# Patient Record
Sex: FEMALE | Race: WHITE | NOT HISPANIC OR LATINO | Employment: FULL TIME
[De-identification: names, ages, dates, MRNs, and addresses within clinical notes are randomized per-mention and may not be internally consistent; named-entity substitution may affect disease eponyms.]

---

## 2017-04-07 ENCOUNTER — CHARTING TRANS (OUTPATIENT)
Dept: OTHER | Age: 22
End: 2017-04-07

## 2017-04-07 ENCOUNTER — LAB SERVICES (OUTPATIENT)
Dept: OTHER | Age: 22
End: 2017-04-07

## 2017-04-07 LAB
APPEARANCE: NORMAL
BILIRUBIN: NORMAL
COLOR: NORMAL
GLUCOSE U: NORMAL
KETONES: NORMAL
LEUKOCYTES: NORMAL
NITRITE: NORMAL
OCCULT BLOOD: NORMAL
PH: 7.5
PROTEIN: NORMAL
URINE SPEC GRAVITY: 1.02
UROBILINOGEN: NORMAL

## 2017-04-10 ENCOUNTER — CHARTING TRANS (OUTPATIENT)
Dept: OTHER | Age: 22
End: 2017-04-10

## 2017-04-14 LAB — BACTERIA UR CULT: NORMAL
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
## 2017-08-01 NOTE — TELEPHONE ENCOUNTER
Diclofenac Sodium 50 MG Oral Tab EC 20 tablet 0 7/5/2017    Sig :  Take 1 tablet (50 mg total) by mouth 2 (two) times daily. Route:   Oral       Pt is requesting refill till appointment clinic cancled. To Pharmacy on file. [id number] [place]
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
## 2017-08-15 ENCOUNTER — LAB ENCOUNTER (OUTPATIENT)
Dept: LAB | Age: 22
End: 2017-08-15
Attending: INTERNAL MEDICINE
Payer: COMMERCIAL

## 2017-08-15 DIAGNOSIS — Z00.00 LABORATORY EXAM ORDERED AS PART OF ROUTINE GENERAL MEDICAL EXAMINATION: ICD-10-CM

## 2017-08-15 LAB
25-HYDROXYVITAMIN D (TOTAL): 31.6 NG/ML (ref 30–100)
ALBUMIN SERPL-MCNC: 3.3 G/DL (ref 3.5–4.8)
ALP LIVER SERPL-CCNC: 69 U/L (ref 52–144)
ALT SERPL-CCNC: 26 U/L (ref 14–54)
AST SERPL-CCNC: 15 U/L (ref 15–41)
BASOPHILS # BLD AUTO: 0.03 X10(3) UL (ref 0–0.1)
BASOPHILS NFR BLD AUTO: 0.3 %
BILIRUB SERPL-MCNC: 0.4 MG/DL (ref 0.1–2)
BUN BLD-MCNC: 9 MG/DL (ref 8–20)
CALCIUM BLD-MCNC: 9 MG/DL (ref 8.3–10.3)
CHLORIDE: 107 MMOL/L (ref 101–111)
CO2: 26 MMOL/L (ref 22–32)
CREAT BLD-MCNC: 0.57 MG/DL (ref 0.55–1.02)
EOSINOPHIL # BLD AUTO: 0.35 X10(3) UL (ref 0–0.3)
EOSINOPHIL NFR BLD AUTO: 4 %
ERYTHROCYTE [DISTWIDTH] IN BLOOD BY AUTOMATED COUNT: 14.4 % (ref 11.5–16)
GLUCOSE BLD-MCNC: 72 MG/DL (ref 70–99)
HAV AB SERPL IA-ACNC: 472 PG/ML (ref 193–986)
HCT VFR BLD AUTO: 39.1 % (ref 34–50)
HGB BLD-MCNC: 11.6 G/DL (ref 12–16)
IMMATURE GRANULOCYTE COUNT: 0.03 X10(3) UL (ref 0–1)
IMMATURE GRANULOCYTE RATIO %: 0.3 %
LYMPHOCYTES # BLD AUTO: 1.89 X10(3) UL (ref 0.9–4)
LYMPHOCYTES NFR BLD AUTO: 21.3 %
M PROTEIN MFR SERPL ELPH: 7.3 G/DL (ref 6.1–8.3)
MCH RBC QN AUTO: 26.2 PG (ref 27–33.2)
MCHC RBC AUTO-ENTMCNC: 29.7 G/DL (ref 31–37)
MCV RBC AUTO: 88.5 FL (ref 81–100)
MONOCYTES # BLD AUTO: 0.63 X10(3) UL (ref 0.1–0.6)
MONOCYTES NFR BLD AUTO: 7.1 %
NEUTROPHIL ABS PRELIM: 5.93 X10 (3) UL (ref 1.3–6.7)
NEUTROPHILS # BLD AUTO: 5.93 X10(3) UL (ref 1.3–6.7)
NEUTROPHILS NFR BLD AUTO: 67 %
PLATELET # BLD AUTO: 345 10(3)UL (ref 150–450)
POTASSIUM SERPL-SCNC: 4.2 MMOL/L (ref 3.6–5.1)
RBC # BLD AUTO: 4.42 X10(6)UL (ref 3.8–5.1)
RED CELL DISTRIBUTION WIDTH-SD: 46.1 FL (ref 35.1–46.3)
SODIUM SERPL-SCNC: 141 MMOL/L (ref 136–144)
TSI SER-ACNC: 1.07 MIU/ML (ref 0.35–5.5)
WBC # BLD AUTO: 8.9 X10(3) UL (ref 4–13)

## 2017-08-15 PROCEDURE — 36415 COLL VENOUS BLD VENIPUNCTURE: CPT | Performed by: INTERNAL MEDICINE

## 2017-08-15 PROCEDURE — 80050 GENERAL HEALTH PANEL: CPT | Performed by: INTERNAL MEDICINE

## 2017-08-15 PROCEDURE — 82306 VITAMIN D 25 HYDROXY: CPT | Performed by: INTERNAL MEDICINE

## 2017-08-15 PROCEDURE — 82607 VITAMIN B-12: CPT | Performed by: INTERNAL MEDICINE
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
## 2018-05-30 NOTE — PROGRESS NOTES
[facility] Internal Medicine Progress Note    CC:  Patient presents with:  Medication Request      HPI:   HPI  Pt is currently in between insurance    Anxiety  Pt is doing well on wellbutrin  Mood is good, this dose is good for her as well  Nacho and time and well-developed, well-nourished, and in no distress. HENT:   Right Ear: External ear normal.   Left Ear: External ear normal.   Nose: Nose normal.   Mouth/Throat: Oropharynx is clear and moist. No oropharyngeal exudate.    Eyes: EOM are normal Problem List:     Anxiety     Depression     Vomiting     Heavy periods     Acne     Anxiety state, unspecified     Depressive disorder, not elsewhere classified

## 2018-05-30 NOTE — PATIENT INSTRUCTIONS
Thank you for choosing [facility] PA-C at [place] [address]  To Do: Kenn Granger  1. Pt to continue medications as prescribed  2.  Follow-up in 6-12 months    • Please signup for MY CHART, which is electronic access to your record if you have no Central Scheduling at [phone]  Please allow our office 5 business days to contact you regarding any testing results.     Refill policies:   Allow 3 business days for refills; controlled substances may take longer and must be picked up from the office i

## 2018-10-16 ENCOUNTER — TELEPHONE (OUTPATIENT)
Dept: FAMILY MEDICINE CLINIC | Facility: CLINIC | Age: 23
End: 2018-10-16

## 2018-10-16 NOTE — TELEPHONE ENCOUNTER
rec'd a medical records request from [facility] ph# [phone] and fax# [phone] - requesting med records from 06-27-17 to present.  I faxed the request to scan stat - confirmation rec'd - copy to scan

## 2018-11-03 VITALS
BODY MASS INDEX: 39.27 KG/M2 | HEART RATE: 72 BPM | SYSTOLIC BLOOD PRESSURE: 108 MMHG | TEMPERATURE: 98.3 F | WEIGHT: 200 LBS | DIASTOLIC BLOOD PRESSURE: 68 MMHG | HEIGHT: 60 IN

## 2019-01-25 ENCOUNTER — TELEPHONE (OUTPATIENT)
Dept: FAMILY MEDICINE CLINIC | Facility: CLINIC | Age: 24
End: 2019-01-25

## 2019-01-25 NOTE — TELEPHONE ENCOUNTER
Recd request and authorization for release of records from [facility] for records 1-1-14 to present. Faxed to [employer] for processing.

## 2019-04-02 ENCOUNTER — TELEPHONE (OUTPATIENT)
Dept: FAMILY MEDICINE CLINIC | Facility: CLINIC | Age: 24
End: 2019-04-02

## 2019-04-09 ENCOUNTER — OFFICE VISIT (OUTPATIENT)
Dept: FAMILY MEDICINE CLINIC | Facility: CLINIC | Age: 24
End: 2019-04-09
Payer: COMMERCIAL

## 2019-04-09 ENCOUNTER — LAB ENCOUNTER (OUTPATIENT)
Dept: LAB | Age: 24
End: 2019-04-09
Attending: FAMILY MEDICINE
Payer: COMMERCIAL

## 2019-04-09 VITALS
TEMPERATURE: 99 F | HEART RATE: 72 BPM | HEIGHT: 60 IN | DIASTOLIC BLOOD PRESSURE: 78 MMHG | BODY MASS INDEX: 41.47 KG/M2 | SYSTOLIC BLOOD PRESSURE: 118 MMHG | RESPIRATION RATE: 16 BRPM | WEIGHT: 211.25 LBS

## 2019-04-09 DIAGNOSIS — F32.A DEPRESSION, UNSPECIFIED DEPRESSION TYPE: ICD-10-CM

## 2019-04-09 DIAGNOSIS — Z11.3 SCREENING EXAMINATION FOR STD (SEXUALLY TRANSMITTED DISEASE): ICD-10-CM

## 2019-04-09 DIAGNOSIS — N92.0 MENORRHAGIA WITH REGULAR CYCLE: ICD-10-CM

## 2019-04-09 DIAGNOSIS — Z00.00 ANNUAL PHYSICAL EXAM: Primary | ICD-10-CM

## 2019-04-09 DIAGNOSIS — Z12.4 CERVICAL CANCER SCREENING: ICD-10-CM

## 2019-04-09 DIAGNOSIS — Z30.41 ENCOUNTER FOR BIRTH CONTROL PILLS MAINTENANCE: ICD-10-CM

## 2019-04-09 DIAGNOSIS — Z23 NEED FOR VACCINATION: ICD-10-CM

## 2019-04-09 DIAGNOSIS — Z00.00 ANNUAL PHYSICAL EXAM: ICD-10-CM

## 2019-04-09 PROBLEM — E28.2 PCOS (POLYCYSTIC OVARIAN SYNDROME): Status: ACTIVE | Noted: 2019-04-09

## 2019-04-09 PROBLEM — E66.01 CLASS 3 SEVERE OBESITY DUE TO EXCESS CALORIES WITHOUT SERIOUS COMORBIDITY WITH BODY MASS INDEX (BMI) OF 40.0 TO 44.9 IN ADULT (HCC): Status: ACTIVE | Noted: 2019-04-09

## 2019-04-09 PROCEDURE — 99213 OFFICE O/P EST LOW 20 MIN: CPT | Performed by: FAMILY MEDICINE

## 2019-04-09 PROCEDURE — 90471 IMMUNIZATION ADMIN: CPT | Performed by: FAMILY MEDICINE

## 2019-04-09 PROCEDURE — 87491 CHLMYD TRACH DNA AMP PROBE: CPT | Performed by: FAMILY MEDICINE

## 2019-04-09 PROCEDURE — 88175 CYTOPATH C/V AUTO FLUID REDO: CPT | Performed by: FAMILY MEDICINE

## 2019-04-09 PROCEDURE — 87591 N.GONORRHOEAE DNA AMP PROB: CPT | Performed by: FAMILY MEDICINE

## 2019-04-09 PROCEDURE — 80050 GENERAL HEALTH PANEL: CPT | Performed by: FAMILY MEDICINE

## 2019-04-09 PROCEDURE — 81025 URINE PREGNANCY TEST: CPT | Performed by: FAMILY MEDICINE

## 2019-04-09 PROCEDURE — 36415 COLL VENOUS BLD VENIPUNCTURE: CPT | Performed by: FAMILY MEDICINE

## 2019-04-09 PROCEDURE — 80061 LIPID PANEL: CPT | Performed by: FAMILY MEDICINE

## 2019-04-09 PROCEDURE — 90715 TDAP VACCINE 7 YRS/> IM: CPT | Performed by: FAMILY MEDICINE

## 2019-04-09 PROCEDURE — 90472 IMMUNIZATION ADMIN EACH ADD: CPT | Performed by: FAMILY MEDICINE

## 2019-04-09 PROCEDURE — 99395 PREV VISIT EST AGE 18-39: CPT | Performed by: FAMILY MEDICINE

## 2019-04-09 RX ORDER — BUPROPION HYDROCHLORIDE 150 MG/1
150 TABLET ORAL DAILY
Qty: 90 TABLET | Refills: 3 | Status: SHIPPED | OUTPATIENT
Start: 2019-04-09 | End: 2020-06-02

## 2019-04-09 RX ORDER — DESOGESTREL AND ETHINYL ESTRADIOL 0.15-0.03
1 KIT ORAL DAILY
Qty: 3 PACKAGE | Refills: 3 | Status: SHIPPED | OUTPATIENT
Start: 2019-04-09 | End: 2020-03-13

## 2019-04-09 NOTE — PROGRESS NOTES
Percival Cooks is a 21year old female that presents for annual physical exam.     Last Pap: due for first pap  Hx of abnormal pap: n/a  STI testing desired: Yes  Mammogram: N/A  Colonoscopy: N/A  PHQ2: 0  Vaccines: needs TDAP, HPV,  Diet and Exercise: has Worry: Not on file        Inability: Not on file      Transportation needs:        Medical: Not on file        Non-medical: Not on file    Tobacco Use      Smoking status: Never Smoker      Smokeless tobacco: Never Used    Substance and Sexual Activity headaches  PSYCHE: +hx of depression or anxiety  HEMATOLOGIC: denies hx of anemia  ENDOCRINE: denies thyroid history  ALL/ASTHMA: denies hx of allergy or asthma    EXAM:   /78   Pulse 72   Temp 98.7 °F (37.1 °C) (Temporal)   Resp 16   Ht 60\"   Wt 21 THINPREP PAP WITH HPV REFLEX REQUEST B; Future  - THINPREP PAP WITH HPV REFLEX REQUEST B    3.  Need for vaccination  - IMMUNIZATION ADMINISTRATION  - EACH ADDITIONAL VACCINE  - TETANUS, DIPHTHERIA TOXOIDS AND ACELLULAR PERTUSIS VACCINE (TDAP), >7 YEARS, IM

## 2019-04-09 NOTE — PATIENT INSTRUCTIONS
Please check your vaccine records (or contact your high school for copy). Due for HPV vaccine #2. Have copy sent to our office or can return for vaccine if due. Thank you for allowing me to participate in your care today.     I will contact you w younger, and women at increased risk for infection Every 3 years if you're at risk or have symptoms   Depression All women in this age group At routine exams   Type 2 diabates, prediabetes All women with no symptoms who are overweight or obese and have 1 o infection should talk with their healthcare provider 1 to 3 doses   Human papillomavirus (HPV) All women in this age group up to age 32 3 doses; the second dose should be given 1 to 2 months after the first dose and the third dose given 6 months after the part of their routine health exam every 3 years. Breast self-exams are an option for women starting in their 25s.  But the USPSTF does not recommend CBE.   2Those who are 25years old and not up-to-date on their childhood vaccines should get all appropriate

## 2019-05-09 ENCOUNTER — TELEPHONE (OUTPATIENT)
Dept: FAMILY MEDICINE CLINIC | Facility: CLINIC | Age: 24
End: 2019-05-09

## 2019-05-16 ENCOUNTER — TELEPHONE (OUTPATIENT)
Dept: FAMILY MEDICINE CLINIC | Facility: CLINIC | Age: 24
End: 2019-05-16

## 2019-05-16 NOTE — TELEPHONE ENCOUNTER
rec'd a fax from [employer]. Requesting medical records from Milton Franco MD from 07- to present.  Ph# [phone] and fax# [phone] - faxed to scan stat

## 2019-05-17 ENCOUNTER — WALK IN (OUTPATIENT)
Dept: URGENT CARE | Age: 24
End: 2019-05-17

## 2019-05-17 DIAGNOSIS — L60.0 INGROWN LEFT BIG TOENAIL: Primary | ICD-10-CM

## 2019-05-17 PROCEDURE — 11730 AVULSION NAIL PLATE SIMPLE 1: CPT | Performed by: FAMILY MEDICINE

## 2019-05-17 PROCEDURE — 99203 OFFICE O/P NEW LOW 30 MIN: CPT | Performed by: FAMILY MEDICINE

## 2019-05-17 RX ORDER — DESOGESTREL AND ETHINYL ESTRADIOL 0.15-0.03
1 KIT ORAL
COMMUNITY
Start: 2019-04-09

## 2019-05-17 ASSESSMENT — PAIN SCALES - GENERAL: PAINLEVEL: 5-6

## 2019-05-22 ENCOUNTER — TELEPHONE (OUTPATIENT)
Dept: FAMILY MEDICINE CLINIC | Facility: CLINIC | Age: 24
End: 2019-05-22
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
## 2019-12-02 ENCOUNTER — WALK IN (OUTPATIENT)
Dept: URGENT CARE | Age: 24
End: 2019-12-02

## 2019-12-02 VITALS
WEIGHT: 202.82 LBS | TEMPERATURE: 98 F | SYSTOLIC BLOOD PRESSURE: 110 MMHG | HEART RATE: 80 BPM | BODY MASS INDEX: 38.29 KG/M2 | HEIGHT: 61 IN | DIASTOLIC BLOOD PRESSURE: 70 MMHG | RESPIRATION RATE: 16 BRPM | OXYGEN SATURATION: 99 %

## 2019-12-02 DIAGNOSIS — Z02.1 PHYSICAL EXAM, PRE-EMPLOYMENT: Primary | ICD-10-CM

## 2019-12-02 PROCEDURE — X0945 SELF PAY APN OR PA PERFORMED ADMINISTRATIVE PHYSICAL: HCPCS | Performed by: REGISTERED NURSE

## 2019-12-12 ENCOUNTER — WALK IN (OUTPATIENT)
Dept: URGENT CARE | Age: 24
End: 2019-12-12

## 2019-12-12 DIAGNOSIS — J02.9 SORE THROAT: Primary | ICD-10-CM

## 2019-12-12 LAB
INTERNAL PROCEDURAL CONTROLS ACCEPTABLE: YES
S PYO AG THROAT QL IA.RAPID: NEGATIVE

## 2019-12-12 PROCEDURE — 99213 OFFICE O/P EST LOW 20 MIN: CPT | Performed by: FAMILY MEDICINE

## 2019-12-12 PROCEDURE — 87880 STREP A ASSAY W/OPTIC: CPT | Performed by: FAMILY MEDICINE

## 2019-12-12 RX ORDER — PREDNISONE 20 MG/1
40 TABLET ORAL DAILY
Qty: 10 TABLET | Refills: 0 | Status: SHIPPED | OUTPATIENT
Start: 2019-12-12 | End: 2019-12-17

## 2019-12-12 SDOH — HEALTH STABILITY: MENTAL HEALTH: HOW OFTEN DO YOU HAVE A DRINK CONTAINING ALCOHOL?: MONTHLY OR LESS

## 2019-12-12 ASSESSMENT — PAIN SCALES - GENERAL: PAINLEVEL: 5-6

## 2020-03-11 DIAGNOSIS — Z30.41 ENCOUNTER FOR BIRTH CONTROL PILLS MAINTENANCE: ICD-10-CM

## 2020-03-11 DIAGNOSIS — N92.0 MENORRHAGIA WITH REGULAR CYCLE: ICD-10-CM

## 2020-03-11 NOTE — TELEPHONE ENCOUNTER
Patient's mother states she will be out of [facility] before her visit on Tuesday, states she left a message when the appt was booked, please advise.      Future Appointments   Date Time Provider hSweta Ernandez   3/17/2020  4:00 PM Sung Lott , DO [facility]

## 2020-03-13 RX ORDER — DESOGESTREL AND ETHINYL ESTRADIOL 0.15-0.03
1 KIT ORAL DAILY
Qty: 3 PACKAGE | Refills: 0 | Status: SHIPPED | OUTPATIENT
Start: 2020-03-13 | End: 2020-06-02

## 2020-03-13 NOTE — TELEPHONE ENCOUNTER
Requested Prescriptions      Desogestrel-Ethinyl Estradiol (ENSKYCE) 0.15-30 MG-MCG Oral Tab              Sig: Take 1 tablet by mouth daily.     Disp:  3 Package    Refills:  3    Start: 3/11/2020    Class: Normal    For: Menorrhagia with regular cycle, Enc

## 2020-06-02 ENCOUNTER — OFFICE VISIT (OUTPATIENT)
Dept: FAMILY MEDICINE CLINIC | Facility: CLINIC | Age: 25
End: 2020-06-02

## 2020-06-02 VITALS
BODY MASS INDEX: 42.46 KG/M2 | SYSTOLIC BLOOD PRESSURE: 122 MMHG | TEMPERATURE: 99 F | WEIGHT: 216.25 LBS | RESPIRATION RATE: 16 BRPM | DIASTOLIC BLOOD PRESSURE: 84 MMHG | HEART RATE: 72 BPM | HEIGHT: 60 IN

## 2020-06-02 DIAGNOSIS — Z11.3 ROUTINE SCREENING FOR STI (SEXUALLY TRANSMITTED INFECTION): ICD-10-CM

## 2020-06-02 DIAGNOSIS — Z30.41 ENCOUNTER FOR BIRTH CONTROL PILLS MAINTENANCE: ICD-10-CM

## 2020-06-02 DIAGNOSIS — F32.A DEPRESSION, UNSPECIFIED DEPRESSION TYPE: ICD-10-CM

## 2020-06-02 DIAGNOSIS — Z00.00 ANNUAL PHYSICAL EXAM: Primary | ICD-10-CM

## 2020-06-02 DIAGNOSIS — N92.0 MENORRHAGIA WITH REGULAR CYCLE: ICD-10-CM

## 2020-06-02 DIAGNOSIS — E28.2 PCOS (POLYCYSTIC OVARIAN SYNDROME): ICD-10-CM

## 2020-06-02 PROCEDURE — 81025 URINE PREGNANCY TEST: CPT | Performed by: FAMILY MEDICINE

## 2020-06-02 PROCEDURE — 99395 PREV VISIT EST AGE 18-39: CPT | Performed by: FAMILY MEDICINE

## 2020-06-02 PROCEDURE — 99213 OFFICE O/P EST LOW 20 MIN: CPT | Performed by: FAMILY MEDICINE

## 2020-06-02 RX ORDER — BUPROPION HYDROCHLORIDE 150 MG/1
150 TABLET ORAL DAILY
Qty: 90 TABLET | Refills: 3 | Status: SHIPPED | OUTPATIENT
Start: 2020-06-02

## 2020-06-02 RX ORDER — DESOGESTREL AND ETHINYL ESTRADIOL 0.15-0.03
1 KIT ORAL DAILY
Qty: 3 PACKAGE | Refills: 4 | Status: SHIPPED | OUTPATIENT
Start: 2020-06-02

## 2020-06-02 NOTE — PATIENT INSTRUCTIONS
Thank you for allowing me to participate in your care today. I will contact you with any results from today's visit. Lab results are typically available in 2-3 days for blood tests, and 3-5 days for any cultures or Paps.    Please let me know if you hav prediabetes All women with no symptoms who are overweight or obese and have 1 or more other risk factors for diabetes At least every 3 years.  Also, testing for diabetes during pregnancy after the 24th week.    Type 2 diabetes, prediabetes All women diagnos months after the first dose and the third dose given 6 months after the first dose   Influenza (flu) All women in this age group Once a year   Measles, mumps, rubella (MMR) All women in this age group who have no record of these infections or vaccines 1 or not up-to-date on their childhood vaccines should get all appropriate catch-up vaccines recommended by the CDC. Date Last Reviewed: 10/1/2017  © 7176-6395 [employer]. [address] [facility] [employer] [place]. All rights reserved.  This info

## 2020-06-02 NOTE — PROGRESS NOTES
Dwain Cook is a 22year old female that presents for annual physical exam.     Last Pap: Pap Smear,3 Years due on 04/09/2022  Hx of abnormal pap: No  STI testing desired: Yes  PHQ2: 0, CSSR: Neg  Vaccines: no flu vaccine this season  Diet and Exercise: file      Highest education level: Not on file    Occupational History      Not on file    Social Needs      Financial resource strain: Not on file      Food insecurity:        Worry: Not on file        Inability: Not on file      Transportation needs: exertion  GI: denies abdominal pain, denies heartburn  : denies dysuria, vaginal discharge or itching, periods regular   MUSCULOSKELETAL: denies back pain  NEURO: denies headaches  PSYCHE: + hx of depression or anxiety  HEMATOLOGIC: denies hx of anemia sex counseling done in detail    3. Menorrhagia with regular cycle  - Desogestrel-Ethinyl Estradiol (ENSKYCE) 0.15-30 MG-MCG Oral Tab; Take 1 tablet by mouth daily. Dispense: 3 Package; Refill: 4    4.  Encounter for birth control pills maintenance  - [place]

## 2021-04-09 DIAGNOSIS — Z23 NEED FOR VACCINATION: ICD-10-CM

## 2021-05-25 VITALS
HEART RATE: 81 BPM | SYSTOLIC BLOOD PRESSURE: 120 MMHG | TEMPERATURE: 99.5 F | RESPIRATION RATE: 18 BRPM | RESPIRATION RATE: 18 BRPM | OXYGEN SATURATION: 98 % | HEART RATE: 72 BPM | DIASTOLIC BLOOD PRESSURE: 82 MMHG | TEMPERATURE: 98.5 F | DIASTOLIC BLOOD PRESSURE: 76 MMHG | SYSTOLIC BLOOD PRESSURE: 120 MMHG | OXYGEN SATURATION: 100 %

## 2021-07-12 DIAGNOSIS — E28.2 PCOS (POLYCYSTIC OVARIAN SYNDROME): ICD-10-CM

## 2021-07-12 NOTE — TELEPHONE ENCOUNTER
Name from pharmacy: metFORMIN HCl 500 MG Oral Tablet         Will file in chart as: METFORMIN  MG Oral Tab    Sig: Take 1 tablet by mouth once daily with breakfast    Disp:  90 tablet    Refills:  0    Start: 7/12/2021    Class: Normal    Non-formu

## 2023-02-02 ENCOUNTER — TELEPHONE (OUTPATIENT)
Dept: FAMILY MEDICINE CLINIC | Facility: CLINIC | Age: 28
End: 2023-02-02

## 2023-04-06 ENCOUNTER — TELEPHONE (OUTPATIENT)
Dept: FAMILY MEDICINE CLINIC | Facility: CLINIC | Age: 28
End: 2023-04-06
